# Patient Record
Sex: MALE | Race: WHITE | NOT HISPANIC OR LATINO | Employment: FULL TIME | URBAN - METROPOLITAN AREA
[De-identification: names, ages, dates, MRNs, and addresses within clinical notes are randomized per-mention and may not be internally consistent; named-entity substitution may affect disease eponyms.]

---

## 2019-08-12 ENCOUNTER — OFFICE VISIT (OUTPATIENT)
Dept: CARDIOLOGY | Facility: CLINIC | Age: 64
End: 2019-08-12

## 2019-08-12 VITALS
BODY MASS INDEX: 25.88 KG/M2 | DIASTOLIC BLOOD PRESSURE: 64 MMHG | HEIGHT: 70 IN | SYSTOLIC BLOOD PRESSURE: 110 MMHG | WEIGHT: 180.8 LBS | HEART RATE: 60 BPM

## 2019-08-12 DIAGNOSIS — I48.91 ATRIAL FIBRILLATION, UNSPECIFIED TYPE (HCC): Primary | ICD-10-CM

## 2019-08-12 PROCEDURE — 93000 ELECTROCARDIOGRAM COMPLETE: CPT | Performed by: INTERNAL MEDICINE

## 2019-08-12 PROCEDURE — 99204 OFFICE O/P NEW MOD 45 MIN: CPT | Performed by: INTERNAL MEDICINE

## 2019-09-02 NOTE — PROGRESS NOTES
Date of Office Visit: 2019  Encounter Provider: Niles Rojas MD  Place of Service: [facility] CARDIOLOGY  Patient Name: Andrew Victoria  :1955    Chief complaint: Lone atrial fibrillation.    History of Present Illness:    I had the pleasure of seeing the patient in cardiology office on 2019.  He is a very pleasant 63 year-old white male with a single episode of atrial fibrillation in the past, hypertension, and obstructive sleep apnea who presents to [facility] care.  The patient has formerly followed with Dr. Roque at [facility].  However, his wife is my patient, and he will be switching care to me today.    The patient has a history of a single episode of documented atrial fibrillation in the year .  He had an inner ear infection at that time, and was vomiting profusely.  He did go on a rapid atrial fibrillation, which converted in the hospital.  He did not feel the atrial fibrillation at that time, although he has never had another documented episode.  He has not been on anticoagulation, but does take aspirin.  For the most part, he is completely asymptomatic.  He is active and fit.  He has had no chest pain, palpitations, shortness of breath, or near syncope.  He does have hypertension which is controlled.  Of note, he does have obstructive sleep apnea, although he does not wear a CPAP machine.    Past Medical History:   Diagnosis Date   • Atrial fibrillation (CMS/HCC)     One episode of atrial fibrillation in  in setting of vomiting    • BPH (benign prostatic hyperplasia)    • Hyperlipidemia    • Hypertension    • LOU (obstructive sleep apnea)     Not on CPAP       Past Surgical History:   Procedure Laterality Date   • CARPAL TUNNEL RELEASE     • ROTATOR CUFF REPAIR     • VASECTOMY         Current Outpatient Medications on File Prior to Visit   Medication Sig Dispense Refill   • aspirin 81 MG tablet Take 81 mg by mouth  "Daily.     • lisinopril (PRINIVIL,ZESTRIL) 2.5 MG tablet TAKE 1 TABLET DAILY     • meloxicam (MOBIC) 7.5 MG tablet      • Multiple Vitamin (MULTIVITAMIN) tablet Take 1 tablet by mouth Daily.     • rosuvastatin (CRESTOR) 5 MG tablet TAKE 1 TABLET DAILY     • tamsulosin (FLOMAX) 0.4 MG capsule 24 hr capsule Take  by mouth.       No current facility-administered medications on file prior to visit.      Allergies as of 08/12/2019   • (No Known Allergies)     Social History     Socioeconomic History   • Marital status:      Spouse name: Not on file   • Number of children: Not on file   • Years of education: Not on file   • Highest education level: Not on file   Tobacco Use   • Smoking status: Never Smoker   • Smokeless tobacco: Never Used   Substance and Sexual Activity   • Alcohol use: No     Frequency: Never   • Drug use: No     Family History   Problem Relation Age of Onset   • Coronary artery disease Mother         Mother with MI in her 80's   • Stroke Mother    • Diabetes Mother    • Aneurysm Mother         Mother with brain aneurysm    • Coronary artery disease Father         Father with MI in his 80's   • Stroke Father    • Breast cancer Sister    • Breast cancer Sister    • Cervical cancer Sister        Review of Systems   HENT: Positive for hearing loss.    Respiratory: Positive for snoring.    All other systems reviewed and are negative.     Objective:     Vitals:    08/12/19 0907   BP: 110/64   Pulse: 60   Weight: 82 kg (180 lb 12.8 oz)   Height: 177.8 cm (70\")     Body mass index is 25.94 kg/m².    Physical Exam   Constitutional: He is oriented to person, place, and time. He appears well-developed and well-nourished.   HENT:   Head: Normocephalic and atraumatic.   Eyes: Conjunctivae are normal.   Neck: Neck supple.   Cardiovascular: Normal rate and regular rhythm. Exam reveals no gallop and no friction rub.   No murmur heard.  Pulmonary/Chest: Effort normal and breath sounds normal.   Abdominal: Soft. " There is no tenderness.   Musculoskeletal: He exhibits no edema.   Neurological: He is alert and oriented to person, place, and time.   Skin: Skin is warm.   Psychiatric: He has a normal mood and affect. His behavior is normal.     Lab Review:     ECG 12 Lead  Date/Time: 8/12/2019 9:04 AM  Performed by: Niles Rojas MD  Authorized by: Niles Rojas MD   Comparison: not compared with previous ECG   Previous ECG: no previous ECG available  Rhythm: sinus rhythm  Rate: normal  BPM: 60    Clinical impression: normal ECG          Assessment:       Diagnosis Plan   1. Atrial fibrillation, unspecified type (CMS/[facility])       Plan:       Again, the patient had one episode of documented atrial fibrillation in the year 2000.  At that time, he had an inner ear infection and was vomiting profusely.  He was asymptomatic during this time, although he has never had any tachycardia or further atrial fibrillation documented.  He works out routinely, and he has not had severely elevated heart rates during this time.  He is very active and fit, and he is asymptomatic.  I do not feel that he needs anticoagulation, and he will continue on the aspirin at 81 mg/day.  If he ever has any documented recurrence of the atrial fibrillation, this will need to be addressed at that time.  His heart rate is 60, and he does not need beta-blockers or calcium channel blockers.  His blood pressure is controlled on a minimal dose of lisinopril at 2.5 mg/day.  He does not need any cardiac testing currently.  He has had multiple tests remotely, including a heart catheterization in 1997 which was normal.  If he has any recurrent atrial fibrillation, utilization of a CPAP machine will also need to be readdressed at that time as he is not treated for his sleep apnea.  For now, I will plan on seeing him back in the office in 1 year.    I spent 45 minutes in the care of the patient, including reviewing a significant amount of outside records, as  well as in direct patient care.  Greater than 50% of this time was spent in face-to-face counseling with the patient regarding his history of atrial fibrillation and future treatment.    Atrial Fibrillation and Atrial Flutter  Assessment  • The patient has atrial fibrillation-initial episode  • This is non-valvular in etiology  • The patient's CHADS2-VASc score is 2  • A [id number] score of 2 or more is considered a high risk for a thromboembolic event  • Aspirin prescribed  • Single episode of atrial fibrillation in 2000 without known recurrence    Plan  • Attempt to maintain sinus rhythm  • Continue aspirin for antithrombotic therapy, bleeding issues discussed

## 2020-10-20 ENCOUNTER — OFFICE VISIT (OUTPATIENT)
Dept: CARDIOLOGY | Facility: CLINIC | Age: 65
End: 2020-10-20

## 2020-10-20 VITALS
SYSTOLIC BLOOD PRESSURE: 110 MMHG | WEIGHT: 188.6 LBS | BODY MASS INDEX: 27.06 KG/M2 | DIASTOLIC BLOOD PRESSURE: 72 MMHG | HEART RATE: 59 BPM | OXYGEN SATURATION: 98 %

## 2020-10-20 DIAGNOSIS — I48.91 LONE ATRIAL FIBRILLATION (HCC): Primary | ICD-10-CM

## 2020-10-20 PROCEDURE — 99213 OFFICE O/P EST LOW 20 MIN: CPT | Performed by: INTERNAL MEDICINE

## 2020-10-20 NOTE — PROGRESS NOTES
Date of Office Visit: 10/20/2020  Encounter Provider: Niles Rojas MD  Place of Service: [facility] CARDIOLOGY  Patient Name: Andrew Victoria  :1955    Chief complaint: Lone atrial fibrillation.    History of Present Illness:    I had the pleasure of seeing the patient in cardiology office on 10/20/2020.  He is a very pleasant 65 year-old white male with a single episode of atrial fibrillation in the past, hypertension, and obstructive sleep apnea who presents for follow-up.  The patient formerly followed with Dr. Roque at [facility], but switched care to me on 2019.    The patient has a history of a single episode of documented atrial fibrillation in the year .  He had an inner ear infection at that time, and was vomiting profusely.  He did go on a rapid atrial fibrillation, which converted in the hospital.  He did not feel the atrial fibrillation at that time, although he has never had another documented episode.  He has not been on anticoagulation, but does take aspirin.  He does have obstructive sleep apnea, although he does not wear a CPAP machine.    The patient presents today for follow-up.  He has not noted any high heart rates or had any known atrial fibrillation since his last visit.  He denies any chest pain or shortness of breath.  His blood pressure is excellent today at 110/72.    Past Medical History:   Diagnosis Date   • Atrial fibrillation (CMS/HCC)     One episode of atrial fibrillation in  in setting of vomiting    • BPH (benign prostatic hyperplasia)    • Hyperlipidemia    • Hypertension    • LOU (obstructive sleep apnea)     Not on CPAP       Past Surgical History:   Procedure Laterality Date   • CARPAL TUNNEL RELEASE     • ROTATOR CUFF REPAIR     • VASECTOMY         Current Outpatient Medications on File Prior to Visit   Medication Sig Dispense Refill   • aspirin 81 MG tablet Take 81 mg by mouth Daily.     •  lisinopril (PRINIVIL,ZESTRIL) 2.5 MG tablet TAKE 1 TABLET DAILY     • meloxicam (MOBIC) 7.5 MG tablet      • Multiple Vitamin (MULTIVITAMIN) tablet Take 1 tablet by mouth Daily.     • rosuvastatin (CRESTOR) 5 MG tablet TAKE 1 TABLET DAILY     • tamsulosin (FLOMAX) 0.4 MG capsule 24 hr capsule Take  by mouth.       No current facility-administered medications on file prior to visit.      Allergies as of 10/20/2020   • (No Known Allergies)     Social History     Socioeconomic History   • Marital status:      Spouse name: Not on file   • Number of children: Not on file   • Years of education: Not on file   • Highest education level: Not on file   Tobacco Use   • Smoking status: Never Smoker   • Smokeless tobacco: Never Used   Substance and Sexual Activity   • Alcohol use: No     Frequency: Never   • Drug use: No     Family History   Problem Relation Age of Onset   • Coronary artery disease Mother         Mother with MI in her 80's   • Stroke Mother    • Diabetes Mother    • Aneurysm Mother         Mother with brain aneurysm    • Coronary artery disease Father         Father with MI in his 80's   • Stroke Father    • Breast cancer Sister    • Breast cancer Sister    • Cervical cancer Sister        Review of Systems   HENT: Positive for hearing loss.    Respiratory: Positive for snoring.    All other systems reviewed and are negative.     Objective:     Vitals:    10/20/20 0839   BP: 110/72   Pulse: 59   SpO2: 98%   Weight: 85.5 kg (188 lb 9.6 oz)     Body mass index is 27.06 kg/m².    Physical Exam   Constitutional: He is oriented to person, place, and time. He appears well-developed and well-nourished.   HENT:   Head: Normocephalic and atraumatic.   Eyes: Conjunctivae are normal.   Neck: Neck supple.   Cardiovascular: Normal rate and regular rhythm. Exam reveals no gallop and no friction rub.   No murmur heard.  Pulmonary/Chest: Effort normal and breath sounds normal.   Abdominal: Soft. There is no abdominal  tenderness.   Musculoskeletal:         General: No edema.   Neurological: He is alert and oriented to person, place, and time.   Skin: Skin is warm.   Psychiatric: He has a normal mood and affect. His behavior is normal.     Lab Review:   Procedures  Assessment:       Diagnosis Plan   1. Lone atrial fibrillation (CMS/[facility])       Plan:       Again, the patient had one episode of documented atrial fibrillation in the year 2000.  At that time, he had an inner ear infection and was vomiting profusely.  He was asymptomatic during this time, although he has never had any tachycardia or further atrial fibrillation documented.  He works out routinely, and he has not had severely elevated heart rates during this time.  He is very active and fit, and he is asymptomatic.  I do not feel that he needs anticoagulation, and he will continue on the aspirin at 81 mg/day.  If he ever has any documented recurrence of the atrial fibrillation, this will need to be addressed at that time.     His baseline heart rate runs right around 60, and he does not need to be on beta-blockers or calcium channel blockers.  His blood pressure is excellent on the lisinopril.  If he has any recurrence of the atrial fibrillation, utilization of a CPAP machine would also need to be readdressed at that time, as he is not treated for his sleep apnea currently.  For now, I did not change any medications.  I will see him back in the office in 1 year.

## 2021-03-18 ENCOUNTER — IMMUNIZATION (OUTPATIENT)
Dept: VACCINE CLINIC | Facility: HOSPITAL | Age: 66
End: 2021-03-18

## 2021-03-18 PROCEDURE — 0001A: CPT | Performed by: INTERNAL MEDICINE

## 2021-03-18 PROCEDURE — 91300 HC SARSCOV02 VAC 30MCG/0.3ML IM: CPT | Performed by: INTERNAL MEDICINE

## 2021-04-08 ENCOUNTER — IMMUNIZATION (OUTPATIENT)
Dept: VACCINE CLINIC | Facility: HOSPITAL | Age: 66
End: 2021-04-08

## 2021-04-08 PROCEDURE — 0002A: CPT | Performed by: INTERNAL MEDICINE

## 2021-04-08 PROCEDURE — 91300 HC SARSCOV02 VAC 30MCG/0.3ML IM: CPT | Performed by: INTERNAL MEDICINE

## 2021-10-08 ENCOUNTER — IMMUNIZATION (OUTPATIENT)
Dept: VACCINE CLINIC | Facility: HOSPITAL | Age: 66
End: 2021-10-08

## 2021-10-08 PROCEDURE — 91300 HC SARSCOV02 VAC 30MCG/0.3ML IM: CPT | Performed by: INTERNAL MEDICINE

## 2021-10-08 PROCEDURE — 0003A: CPT | Performed by: INTERNAL MEDICINE

## 2021-10-08 PROCEDURE — 0004A ADM SARSCOV2 30MCG/0.3ML BOOSTER: CPT | Performed by: INTERNAL MEDICINE

## 2021-10-21 ENCOUNTER — OFFICE VISIT (OUTPATIENT)
Dept: CARDIOLOGY | Facility: CLINIC | Age: 66
End: 2021-10-21

## 2021-10-21 VITALS
WEIGHT: 186 LBS | SYSTOLIC BLOOD PRESSURE: 110 MMHG | HEART RATE: 57 BPM | BODY MASS INDEX: 26.63 KG/M2 | DIASTOLIC BLOOD PRESSURE: 57 MMHG | HEIGHT: 70 IN

## 2021-10-21 DIAGNOSIS — I48.91 ATRIAL FIBRILLATION, UNSPECIFIED TYPE (HCC): Primary | ICD-10-CM

## 2021-10-21 PROCEDURE — 99213 OFFICE O/P EST LOW 20 MIN: CPT | Performed by: INTERNAL MEDICINE

## 2021-10-21 RX ORDER — CYCLOBENZAPRINE HCL 10 MG
10 TABLET ORAL 3 TIMES DAILY PRN
COMMUNITY
Start: 2021-08-30

## 2021-10-21 RX ORDER — MELATONIN
1000 DAILY
COMMUNITY

## 2021-10-21 RX ORDER — FLUOROURACIL 50 MG/G
1 CREAM TOPICAL TAKE AS DIRECTED
COMMUNITY

## 2021-10-21 NOTE — PROGRESS NOTES
Date of Office Visit: 10/21/2021  Encounter Provider: Niles Rojas MD  Place of Service: [facility] CARDIOLOGY  Patient Name: Andrew Victoria  :1955    Chief complaint: Follow-up for lone atrial fibrillation.    History of Present Illness:    I again had the pleasure of seeing the patient in cardiology office on 10/21/2021.  He is a very pleasant 66 year-old white male with a single episode of atrial fibrillation in the past, hypertension, and obstructive sleep apnea who presents for follow-up.  The patient formerly followed with Dr. Roque at [facility], but switched care to me on 2019.    The patient has a history of a single episode of documented atrial fibrillation in the year .  He had an inner ear infection at that time, and was vomiting profusely.  He did go on a rapid atrial fibrillation, which converted in the hospital.  He did not feel the atrial fibrillation at that time, although he has never had another documented episode.  He has not been on anticoagulation, but does take aspirin.  He does have obstructive sleep apnea, although he does not wear a CPAP machine.    The patient presents today for follow-up.  He is doing very well.  He has had no chest pain or shortness of breath.  He is still very active.  His blood pressure is excellent today at 110/57.      Past Medical History:   Diagnosis Date   • Atrial fibrillation (HCC)     One episode of atrial fibrillation in  in setting of vomiting    • BPH (benign prostatic hyperplasia)    • Hyperlipidemia    • Hypertension    • LOU (obstructive sleep apnea)     Not on CPAP       Past Surgical History:   Procedure Laterality Date   • CARPAL TUNNEL RELEASE     • ROTATOR CUFF REPAIR     • VASECTOMY         Current Outpatient Medications on File Prior to Visit   Medication Sig Dispense Refill   • aspirin 81 MG tablet Take 81 mg by mouth Daily.     • cholecalciferol (VITAMIN D3) 25  "MCG (1000 UT) tablet Take 1,000 Units by mouth Daily.     • cyclobenzaprine (FLEXERIL) 10 MG tablet Take 10 mg by mouth 3 (Three) Times a Day As Needed.     • fluorouracil (EFUDEX) 5 % cream Apply 1 application topically to the appropriate area as directed Take As Directed.     • lisinopril (PRINIVIL,ZESTRIL) 2.5 MG tablet TAKE 1 TABLET DAILY     • Multiple Vitamin (MULTIVITAMIN) tablet Take 1 tablet by mouth Daily.     • psyllium (METAMUCIL) 0.52 g capsule Take 0.52 g by mouth Daily.     • rosuvastatin (CRESTOR) 5 MG tablet TAKE 1 TABLET DAILY     • tamsulosin (FLOMAX) 0.4 MG capsule 24 hr capsule Take  by mouth.     • [DISCONTINUED] meloxicam (MOBIC) 7.5 MG tablet        No current facility-administered medications on file prior to visit.     Allergies as of 10/21/2021   • (No Known Allergies)     Social History     Socioeconomic History   • Marital status:    Tobacco Use   • Smoking status: Never Smoker   • Smokeless tobacco: Never Used   Substance and Sexual Activity   • Alcohol use: No   • Drug use: No     Family History   Problem Relation Age of Onset   • Coronary artery disease Mother         Mother with MI in her 80's   • Stroke Mother    • Diabetes Mother    • Aneurysm Mother         Mother with brain aneurysm    • Coronary artery disease Father         Father with MI in his 80's   • Stroke Father    • Breast cancer Sister    • Breast cancer Sister    • Cervical cancer Sister        Review of Systems   HENT: Positive for hearing loss.    Respiratory: Positive for snoring.    All other systems reviewed and are negative.     Objective:     Vitals:    10/21/21 0823   BP: 110/57   Pulse: 57   Weight: 84.4 kg (186 lb)   Height: 177.8 cm (70\")     Body mass index is 26.69 kg/m².    Physical Exam  Constitutional:       Appearance: He is well-developed.   HENT:      Head: Normocephalic and atraumatic.   Eyes:      Conjunctiva/sclera: Conjunctivae normal.   Cardiovascular:      Rate and Rhythm: Normal rate " and regular rhythm.      Heart sounds: No murmur heard.  No friction rub. No gallop.    Pulmonary:      Effort: Pulmonary effort is normal.      Breath sounds: Normal breath sounds.   Abdominal:      Palpations: Abdomen is soft.      Tenderness: There is no abdominal tenderness.   Musculoskeletal:      Cervical back: Neck supple.   Skin:     General: Skin is warm.   Neurological:      Mental Status: He is alert and oriented to person, place, and time.   Psychiatric:         Behavior: Behavior normal.       Lab Review:   Procedures     Assessment:       Diagnosis Plan   1. Atrial fibrillation, unspecified type (HCC)       Plan:       I feel the patient is doing excellent.  He has had no palpitations are noted recurrence of the atrial fibrillation since 2000.  Again, the patient had one episode of documented atrial fibrillation in the year 2000.  At that time, he had an inner ear infection and was vomiting profusely.  He was asymptomatic during this time, although he has never had any tachycardia or further atrial fibrillation documented.  He works out routinely, and he has not had severely elevated heart rates during this time.  He is very active and fit, and he is asymptomatic.  I do not feel that he needs anticoagulation, and he will continue on the aspirin at 81 mg/day.  If he ever has any documented recurrence of the atrial fibrillation, this will need to be addressed at that time.     His baseline heart rate runs right around 60, and he does not need to be on beta-blockers or calcium channel blockers.  His blood pressure is excellent on the lisinopril.  If he has any recurrence of the atrial fibrillation, utilization of a CPAP machine would also need to be readdressed at that time, as he is not treated for his sleep apnea currently.  He has a family history of carotid artery disease in both of his parents, and does take low-dose Crestor 5 mg/day.  His latest lipid panel from 8/23/2021 showed an LDL of 94, HDL  44, triglycerides 109, and total cholesterol 160.  I will see him back in the office in 1 year.

## 2022-10-27 PROBLEM — Z86.79 HISTORY OF ATRIAL FIBRILLATION: Status: ACTIVE | Noted: 2022-10-27

## 2022-10-27 NOTE — PROGRESS NOTES
Date of Office Visit: 10/28/2022  Encounter Provider: TAMIA Robertson  Place of Service: [facility] CARDIOLOGY  Patient Name: Andrew Victoria  :1955    No chief complaint on file.  : one year follow up     HPI: Andrew Victoria is a 67 y.o. male who is a patient of Dr. Rojas.  He is new to me today and presents for 1 year office follow-up.  Patient follows Dr. Rojas after a single episode of documented atrial fibrillation in the year .  At that time, he had an inner ear infection and was vomiting profusely.  He did go into rapid atrial fibrillation which converted in the hospital.  Patient has not had another documented episode.  He does have sleep apnea and does not wear a CPAP machine.    On his last office visit, patient was doing very well.  He was very active.  Blood pressure was well controlled.    Today patient presents with no complaints.  He is very active.  He and his wife enjoy going to [place] and always stay on the go.  He has retired from 44 years at [employer] and is enjoying detention.  His blood pressure is well controlled.  EKG normal.  Recent lipid panel drawn by his PCP normal range except HDL slightly low.  Patient does not wear his CPAP as he states that he feels like he gets more sleep without it.    Previous testing and notes have been reviewed by me.   Past Medical History:   Diagnosis Date   • Atrial fibrillation (HCC)     One episode of atrial fibrillation in  in setting of vomiting    • BPH (benign prostatic hyperplasia)    • Hyperlipidemia    • Hypertension    • LOU (obstructive sleep apnea)     Not on CPAP       Past Surgical History:   Procedure Laterality Date   • CARPAL TUNNEL RELEASE     • ROTATOR CUFF REPAIR     • VASECTOMY         Social History     Socioeconomic History   • Marital status:    Tobacco Use   • Smoking status: Never   • Smokeless tobacco: Never   Substance and Sexual Activity   • Alcohol  "use: No   • Drug use: No       Family History   Problem Relation Age of Onset   • Coronary artery disease Mother         Mother with MI in her 80's   • Stroke Mother    • Diabetes Mother    • Aneurysm Mother         Mother with brain aneurysm    • Coronary artery disease Father         Father with MI in his 80's   • Stroke Father    • Breast cancer Sister    • Breast cancer Sister    • Cervical cancer Sister        Review of Systems   Constitutional: Negative.   HENT: Negative.    Eyes: Negative.    Cardiovascular: Negative.    Respiratory: Negative.    Endocrine: Negative.    Hematologic/Lymphatic: Negative.    Skin: Negative.    Musculoskeletal: Negative.    Gastrointestinal: Negative.    Genitourinary: Negative.    Neurological: Negative.    Psychiatric/Behavioral: Negative.    Allergic/Immunologic: Negative.        No Known Allergies      Current Outpatient Medications:   •  aspirin 81 MG tablet, Take 81 mg by mouth Daily., Disp: , Rfl:   •  cholecalciferol (VITAMIN D3) 25 MCG (1000 UT) tablet, Take 1,000 Units by mouth Daily., Disp: , Rfl:   •  fluorouracil (EFUDEX) 5 % cream, Apply 1 application topically to the appropriate area as directed Take As Directed., Disp: , Rfl:   •  lisinopril (PRINIVIL,ZESTRIL) 2.5 MG tablet, TAKE 1 TABLET DAILY, Disp: , Rfl:   •  Multiple Vitamin (MULTIVITAMIN) tablet, Take 1 tablet by mouth Daily., Disp: , Rfl:   •  psyllium (METAMUCIL) 0.52 g capsule, Take 0.52 g by mouth Daily., Disp: , Rfl:   •  rosuvastatin (CRESTOR) 5 MG tablet, TAKE 1 TABLET DAILY, Disp: , Rfl:   •  tamsulosin (FLOMAX) 0.4 MG capsule 24 hr capsule, Take  by mouth., Disp: , Rfl:   •  cyclobenzaprine (FLEXERIL) 10 MG tablet, Take 10 mg by mouth 3 (Three) Times a Day As Needed., Disp: , Rfl:       Objective:     Vitals:    10/28/22 1112   BP: 110/74   BP Location: Left arm   Patient Position: Sitting   Cuff Size: Adult   Pulse: 58   Weight: 83.9 kg (185 lb)   Height: 177.8 cm (70\")     Body mass index is 26.54 " kg/m².    PHYSICAL EXAM:    Constitutional:       Appearance: Healthy appearance. Not in distress.   Neck:      Vascular: No JVR. JVD normal.   Pulmonary:      Effort: Pulmonary effort is normal.      Breath sounds: Normal breath sounds. No wheezing. No rhonchi. No rales.   Chest:      Chest wall: Not tender to palpatation.   Cardiovascular:      PMI at left midclavicular line. Normal rate. Regular rhythm. Normal S1. Normal S2.      Murmurs: There is no murmur.      No gallop. No click. No rub.   Pulses:     Intact distal pulses.   Edema:     Peripheral edema absent.   Abdominal:      General: Bowel sounds are normal.      Palpations: Abdomen is soft.      Tenderness: There is no abdominal tenderness.   Musculoskeletal: Normal range of motion.         General: No tenderness. Skin:     General: Skin is warm and dry.   Neurological:      General: No focal deficit present.      Mental Status: Alert and oriented to person, place and time.           ECG 12 Lead    Date/Time: 10/28/2022 12:14 PM  Performed by: Rocio Box APRN  Authorized by: Rocio Box APRN   Comparison: compared with previous ECG from 10/21/2021  Rhythm: sinus rhythm  Rate: normal  BPM: 58  Conduction: conduction normal  ST Segments: ST segments normal  T Waves: T waves normal    Clinical impression: normal ECG              Assessment:       Diagnosis Plan   1. History of atrial fibrillation        2. Essential hypertension          Orders Placed This Encounter   Procedures   • ECG 12 Lead     This order was created via procedure documentation     Order Specific Question:   Release to patient     Answer:   Routine Release          Plan:       1.  History of single episode of atrial fibrillation: Converted while in hospital in 2000.  No recurrence document  2.  Hypertension: Well-controlled    Mr. Victoria will follow up with Dr. Rojas in 1 year.  He will call sooner for any questions or concerns         Your medication list           Accurate as of October 28, 2022 12:16 PM. If you have any questions, ask your nurse or doctor.            CONTINUE taking these medications      Instructions Last Dose Given Next Dose Due   aspirin 81 MG tablet      Take 81 mg by mouth Daily.       cholecalciferol 25 MCG (1000 UT) tablet  Commonly known as: VITAMIN D3      Take 1,000 Units by mouth Daily.       cyclobenzaprine 10 MG tablet  Commonly known as: FLEXERIL      Take 10 mg by mouth 3 (Three) Times a Day As Needed.       fluorouracil 5 % cream  Commonly known as: EFUDEX      Apply 1 application topically to the appropriate area as directed Take As Directed.       lisinopril 2.5 MG tablet  Commonly known as: PRINIVIL,ZESTRIL      TAKE 1 TABLET DAILY       multivitamin tablet tablet  Generic drug: multivitamin      Take 1 tablet by mouth Daily.       psyllium 0.52 g capsule  Commonly known as: METAMUCIL      Take 0.52 g by mouth Daily.       rosuvastatin 5 MG tablet  Commonly known as: CRESTOR      TAKE 1 TABLET DAILY       tamsulosin 0.4 MG capsule 24 hr capsule  Commonly known as: FLOMAX      Take  by mouth.                As always, it has been a pleasure to participate in your patient's care.      Sincerely,       TAMIA Dixon

## 2022-10-28 ENCOUNTER — OFFICE VISIT (OUTPATIENT)
Dept: CARDIOLOGY | Facility: CLINIC | Age: 67
End: 2022-10-28

## 2022-10-28 VITALS
SYSTOLIC BLOOD PRESSURE: 110 MMHG | BODY MASS INDEX: 26.48 KG/M2 | WEIGHT: 185 LBS | HEIGHT: 70 IN | DIASTOLIC BLOOD PRESSURE: 74 MMHG | HEART RATE: 58 BPM

## 2022-10-28 DIAGNOSIS — Z86.79 HISTORY OF ATRIAL FIBRILLATION: Primary | ICD-10-CM

## 2022-10-28 DIAGNOSIS — I10 ESSENTIAL HYPERTENSION: ICD-10-CM

## 2022-10-28 PROCEDURE — 99214 OFFICE O/P EST MOD 30 MIN: CPT | Performed by: NURSE PRACTITIONER

## 2022-10-28 PROCEDURE — 93000 ELECTROCARDIOGRAM COMPLETE: CPT | Performed by: NURSE PRACTITIONER

## 2024-12-06 ENCOUNTER — OFFICE VISIT (OUTPATIENT)
Dept: CARDIOLOGY | Facility: CLINIC | Age: 69
End: 2024-12-06
Payer: MEDICARE

## 2024-12-06 VITALS
DIASTOLIC BLOOD PRESSURE: 80 MMHG | HEART RATE: 53 BPM | BODY MASS INDEX: 26.77 KG/M2 | HEIGHT: 70 IN | OXYGEN SATURATION: 99 % | SYSTOLIC BLOOD PRESSURE: 140 MMHG | WEIGHT: 187 LBS

## 2024-12-06 DIAGNOSIS — Z86.79 HISTORY OF ATRIAL FIBRILLATION: Primary | ICD-10-CM

## 2024-12-06 DIAGNOSIS — I10 ESSENTIAL HYPERTENSION: ICD-10-CM

## 2024-12-06 NOTE — ASSESSMENT & PLAN NOTE
BP controlled at 140/80 and has been more controlled at other physicians visits  Continue lisinopril 2.5 mg/day    Hypertension is stable and controlled  Continue current treatment regimen.  Dietary sodium restriction.  Regular aerobic exercise.  Blood pressure will be reassessed in 1 year.

## 2024-12-06 NOTE — ASSESSMENT & PLAN NOTE
1 singular episode in 2000 in the setting of vomiting secondary to an ear infection, no recurrence  In sinus rhythm on ECG today  Continue aspirin

## 2024-12-06 NOTE — PROGRESS NOTES
"    CARDIOLOGY        Patient Name: Andrew Victoria  :1955  Age: 69 y.o.  Primary Cardiologist: Jatin Rojas MD  Encounter Provider:  TAMIA Ramirez    Date of Service: 2024      CHIEF COMPLAINT / REASON FOR OFFICE VISIT     Follow-up (AF, HTN)      HISTORY OF PRESENT ILLNESS       HPI  Andrew Victoria is a 69 y.o. male who presents today for annual assessment.     Pt has a  history significant for atrial fibrillation, HTN, LOU.    Patient establish care in our clinic after a single episode of atrial fibrillation in the .  He had an ear infection and was vomiting profusely at the time.  He was asymptomatic.  He was not started on anticoagulation due to no recurrence.    Patient reports that he is overdue due to issues with [employer] and [employer] a few months ago.  His BP has been controlled at other appointments.  He is asymptomatic and denies any episodes of chest pain, shortness of breath, palpitations, lightheadedness, swelling or fatigue.      The following portions of the patient's history were reviewed and updated as appropriate: allergies, current medications, past family history, past medical history, past social history, past surgical history and problem list.      VITAL SIGNS     Visit Vitals  /80 (BP Location: Left arm, Patient Position: Sitting, Cuff Size: Adult)   Pulse 53   Ht 177.8 cm (70\")   Wt 84.8 kg (187 lb)   SpO2 99%   BMI 26.83 kg/m²         Wt Readings from Last 3 Encounters:   24 84.8 kg (187 lb)   10/28/22 83.9 kg (185 lb)   10/21/21 84.4 kg (186 lb)     Body mass index is 26.83 kg/m².      REVIEW OF SYSTEMS     Review of Systems   Constitutional: Negative for chills, fever, weight gain and weight loss.   Cardiovascular:  Negative for leg swelling.   Respiratory:  Negative for cough, snoring and wheezing.    Hematologic/Lymphatic: Negative for bleeding problem. Does not bruise/bleed easily.   Skin:  Negative for color change.   Musculoskeletal:  " Negative for falls, joint pain and myalgias.   Gastrointestinal:  Negative for melena.   Genitourinary:  Negative for hematuria.   Neurological:  Negative for excessive daytime sleepiness.   Psychiatric/Behavioral:  Negative for depression. The patient is not nervous/anxious.            PHYSICAL EXAMINATION     Constitutional:       Appearance: Normal appearance. Well-developed.   Eyes:      Conjunctiva/sclera: Conjunctivae normal.   Neck:      Vascular: No carotid bruit.   Pulmonary:      Effort: Pulmonary effort is normal.      Breath sounds: Normal breath sounds.   Cardiovascular:      Normal rate. Regular rhythm. Normal S1. Normal S2.       Murmurs: There is no murmur.      No gallop.  No click. No rub.   Edema:     Peripheral edema absent.   Musculoskeletal: Normal range of motion. Skin:     General: Skin is warm and dry.   Neurological:      Mental Status: Alert and oriented to person, place, and time.      GCS: GCS eye subscore is 4. GCS verbal subscore is 5. GCS motor subscore is 6.   Psychiatric:         Speech: Speech normal.         Behavior: Behavior normal.         Thought Content: Thought content normal.         Judgment: Judgment normal.           REVIEWED DATA       ECG 12 Lead    Date/Time: 12/6/2024 1:39 PM  Performed by: Kristie Galeana APRN    Authorized by: Kristie Galeana APRN  Comparison: compared with previous ECG from 10/28/2022  Similar to previous ECG  Rhythm: sinus rhythm  Rate: normal  BPM: 56  Conduction: conduction normal  ST Segments: ST segments normal  T Waves: T waves normal  QRS axis: normal    Clinical impression: normal ECG            Lab Results   Component Value Date     03/10/2023     09/13/2022    K 4.5 03/10/2023    K 4.3 09/13/2022     03/10/2023     09/13/2022    CO2 27 03/10/2023    CO2 28 09/13/2022    BUN 12 03/10/2023    BUN 12 09/13/2022    CREATININE 0.88 03/10/2023    CREATININE 0.86 09/13/2022    EGFRIFAFRI >60 03/10/2023     "EGFRIFAFRI >60 09/13/2022    CALCIUM 9.1 03/10/2023    CALCIUM 9.3 09/13/2022    ALBUMIN 4.4 03/10/2023    ALBUMIN 4.4 09/13/2022    BILITOT 0.5 03/10/2023    BILITOT 0.4 09/13/2022    AST 30 03/10/2023    AST 24 09/13/2022    ALT 29 03/10/2023    ALT 18 09/13/2022     Lab Results   Component Value Date    WBC 5.23 09/19/2024    WBC 5.79 03/15/2024    HGB 15.1 09/19/2024    HGB 15.1 03/15/2024    HCT 46.4 09/19/2024    HCT 46.1 03/15/2024    MCV 93.4 09/19/2024    MCV 93.5 03/15/2024     09/19/2024     03/15/2024     No results found for: \"PROBNP\", \"BNP\"  No results found for: \"CKTOTAL\", \"CKMB\", \"CKMBINDEX\", \"TROPONINI\", \"TROPONINT\"  Lab Results   Component Value Date    TSH 1.850 02/05/2021             ASSESSMENT & PLAN     Diagnoses and all orders for this visit:    1. History of atrial fibrillation (Primary)  Assessment & Plan:  1 singular episode in 2000 in the setting of vomiting secondary to an ear infection, no recurrence  In sinus rhythm on ECG today  Continue aspirin    Orders:  -     ECG 12 Lead    2. Essential hypertension  Assessment & Plan:  BP controlled at 140/80 and has been more controlled at other physicians visits  Continue lisinopril 2.5 mg/day    Hypertension is stable and controlled  Continue current treatment regimen.  Dietary sodium restriction.  Regular aerobic exercise.  Blood pressure will be reassessed in 1 year.          Return in about 1 year (around 12/6/2025) for Dr. Roque-transfer care from .  Wife sees Mya.          MEDICATIONS         Discharge Medications            Accurate as of December 6, 2024  2:16 PM. If you have any questions, ask your nurse or doctor.                Continue These Medications        Instructions Start Date   aspirin 81 MG tablet   81 mg, Daily      cholecalciferol 25 MCG (1000 UT) tablet  Commonly known as: VITAMIN D3   1,000 Units, Daily      cyclobenzaprine 10 MG tablet  Commonly known as: FLEXERIL   10 mg, 3 Times Daily PRN    "   fluorouracil 5 % cream  Commonly known as: EFUDEX   Take As Directed      lisinopril 2.5 MG tablet  Commonly known as: PRINIVIL,ZESTRIL   TAKE 1 TABLET DAILY      multivitamin tablet  Generic drug: multivitamin   1 tablet, Daily      psyllium 0.52 g capsule  Commonly known as: METAMUCIL   0.52 g, Daily      rosuvastatin 5 MG tablet  Commonly known as: CRESTOR   TAKE 1 TABLET DAILY      tamsulosin 0.4 MG capsule 24 hr capsule  Commonly known as: FLOMAX   Take  by mouth.                   **Dragon Disclaimer:   Much of this encounter note is an electronic transcription/translation of spoken language to printed text. The electronic translation of spoken language may permit erroneous, or at times, nonsensical words or phrases to be inadvertently transcribed. Although I have reviewed the note for such errors, some may still exist.

## 2025-02-10 VITALS
SYSTOLIC BLOOD PRESSURE: 119 MMHG | OXYGEN SATURATION: 95 % | HEART RATE: 71 BPM | DIASTOLIC BLOOD PRESSURE: 67 MMHG | HEART RATE: 89 BPM | HEART RATE: 75 BPM | SYSTOLIC BLOOD PRESSURE: 100 MMHG | OXYGEN SATURATION: 98 % | SYSTOLIC BLOOD PRESSURE: 107 MMHG | SYSTOLIC BLOOD PRESSURE: 114 MMHG | OXYGEN SATURATION: 96 % | OXYGEN SATURATION: 99 % | SYSTOLIC BLOOD PRESSURE: 105 MMHG | OXYGEN SATURATION: 94 % | HEIGHT: 70 IN | HEART RATE: 73 BPM | RESPIRATION RATE: 15 BRPM | HEART RATE: 84 BPM | SYSTOLIC BLOOD PRESSURE: 106 MMHG | DIASTOLIC BLOOD PRESSURE: 65 MMHG | HEART RATE: 76 BPM | WEIGHT: 187 LBS | HEART RATE: 72 BPM | SYSTOLIC BLOOD PRESSURE: 149 MMHG | DIASTOLIC BLOOD PRESSURE: 66 MMHG | SYSTOLIC BLOOD PRESSURE: 104 MMHG | TEMPERATURE: 97.2 F | HEART RATE: 77 BPM | DIASTOLIC BLOOD PRESSURE: 70 MMHG | DIASTOLIC BLOOD PRESSURE: 68 MMHG | TEMPERATURE: 97.3 F | DIASTOLIC BLOOD PRESSURE: 81 MMHG | DIASTOLIC BLOOD PRESSURE: 72 MMHG | SYSTOLIC BLOOD PRESSURE: 102 MMHG | HEART RATE: 69 BPM | RESPIRATION RATE: 14 BRPM | RESPIRATION RATE: 13 BRPM | RESPIRATION RATE: 16 BRPM

## 2025-02-12 ENCOUNTER — AMBULATORY SURGICAL CENTER (AMBULATORY)
Dept: URBAN - METROPOLITAN AREA SURGERY 17 | Facility: SURGERY | Age: 70
End: 2025-02-12
Payer: MEDICARE

## 2025-02-12 ENCOUNTER — OFFICE (AMBULATORY)
Dept: URBAN - METROPOLITAN AREA PATHOLOGY 4 | Facility: PATHOLOGY | Age: 70
End: 2025-02-12
Payer: MEDICARE

## 2025-02-12 DIAGNOSIS — D12.8 BENIGN NEOPLASM OF RECTUM: ICD-10-CM

## 2025-02-12 DIAGNOSIS — Z12.11 ENCOUNTER FOR SCREENING FOR MALIGNANT NEOPLASM OF COLON: ICD-10-CM

## 2025-02-12 DIAGNOSIS — D12.0 BENIGN NEOPLASM OF CECUM: ICD-10-CM

## 2025-02-12 DIAGNOSIS — K57.30 DIVERTICULOSIS OF LARGE INTESTINE WITHOUT PERFORATION OR ABS: ICD-10-CM

## 2025-02-12 PROBLEM — K63.5 POLYP OF COLON: Status: ACTIVE | Noted: 2025-02-12

## 2025-02-12 PROBLEM — K62.1 RECTAL POLYP: Status: ACTIVE | Noted: 2025-02-12

## 2025-02-12 LAB
GI HISTOLOGY: A. CECUM: (no result)
GI HISTOLOGY: B. RECTUM: (no result)
GI HISTOLOGY: PDF REPORT: (no result)

## 2025-02-12 PROCEDURE — 88305 TISSUE EXAM BY PATHOLOGIST: CPT | Performed by: PATHOLOGY

## 2025-02-12 PROCEDURE — 45380 COLONOSCOPY AND BIOPSY: CPT | Mod: PT | Performed by: INTERNAL MEDICINE
